# Patient Record
Sex: MALE | Race: WHITE | NOT HISPANIC OR LATINO | Employment: PART TIME | URBAN - METROPOLITAN AREA
[De-identification: names, ages, dates, MRNs, and addresses within clinical notes are randomized per-mention and may not be internally consistent; named-entity substitution may affect disease eponyms.]

---

## 2022-01-19 ENCOUNTER — TELEPHONE (OUTPATIENT)
Dept: HEMATOLOGY/ONCOLOGY | Facility: CLINIC | Age: 72
End: 2022-01-19
Payer: MEDICARE

## 2022-01-19 NOTE — TELEPHONE ENCOUNTER
----- Message from Elliot Hay MD sent at 1/18/2022  4:12 PM CST -----  Amber ulloa,   Pt is father of one our vascular surgeons (pt name; contact info below).  He has DVT x 2.  He travels between [place] and [place].   Can you reach out to schedule an appt with me during a time he will be in [place] in coming weeks?  Thank you   Dr. ZACH Helton    Male, 71 y.o., 1950  Phone:   [phone] (A)    ----- Message -----  From: Remington Helton MD  Sent: 1/14/2022   5:18 PM CST  To: MD Dr. Bharti Robertson,    Our parents are friends and from [place].  My dad has had his second LE DVT and is currently on Xarelto.  I was wondering if I could get him in to see you to determine if he needs long term anticoagulation, and if you could give me a call when you have a few minutes any day.  My cell is [phone].  I appreciate your help.    Nader

## 2022-01-19 NOTE — TELEPHONE ENCOUNTER
Called patient to discuss benign hematology consult with Dr. Hay in coming weeks. Patient states he will be in town on February 2, 8, and 25th. Discussed with Dr. Hay, ok to put in his benign hematology clinic on Feb 2 in the morning. Patient and I reviewed where to go for appt, verbalized understanding.     Will attempt to request records from patient's PCP who ordered the US that diagnosed both DVTs, patient states US was done at [facility] on [address].

## 2022-02-02 ENCOUNTER — OFFICE VISIT (OUTPATIENT)
Dept: HEMATOLOGY/ONCOLOGY | Facility: CLINIC | Age: 72
End: 2022-02-02
Payer: MEDICARE

## 2022-02-02 VITALS
OXYGEN SATURATION: 99 % | WEIGHT: 166.13 LBS | RESPIRATION RATE: 16 BRPM | DIASTOLIC BLOOD PRESSURE: 69 MMHG | SYSTOLIC BLOOD PRESSURE: 114 MMHG | BODY MASS INDEX: 23.26 KG/M2 | HEART RATE: 53 BPM | HEIGHT: 71 IN

## 2022-02-02 DIAGNOSIS — I82.4Y9 DEEP VEIN THROMBOSIS (DVT) OF PROXIMAL LOWER EXTREMITY, UNSPECIFIED CHRONICITY, UNSPECIFIED LATERALITY: Primary | ICD-10-CM

## 2022-02-02 PROCEDURE — 99999 PR PBB SHADOW E&M-EST. PATIENT-LVL III: ICD-10-PCS | Mod: PBBFAC,,, | Performed by: INTERNAL MEDICINE

## 2022-02-02 PROCEDURE — 99205 OFFICE O/P NEW HI 60 MIN: CPT | Mod: S$PBB,,, | Performed by: INTERNAL MEDICINE

## 2022-02-02 PROCEDURE — 99999 PR PBB SHADOW E&M-EST. PATIENT-LVL III: CPT | Mod: PBBFAC,,, | Performed by: INTERNAL MEDICINE

## 2022-02-02 PROCEDURE — 99213 OFFICE O/P EST LOW 20 MIN: CPT | Mod: PBBFAC | Performed by: INTERNAL MEDICINE

## 2022-02-02 PROCEDURE — 99205 PR OFFICE/OUTPT VISIT, NEW, LEVL V, 60-74 MIN: ICD-10-PCS | Mod: S$PBB,,, | Performed by: INTERNAL MEDICINE

## 2022-02-02 RX ORDER — RIVAROXABAN 20 MG/1
20 TABLET, FILM COATED ORAL DAILY
COMMUNITY
Start: 2021-12-26

## 2022-02-02 RX ORDER — SIMVASTATIN 40 MG/1
40 TABLET, FILM COATED ORAL NIGHTLY
COMMUNITY
Start: 2022-01-03

## 2022-02-02 NOTE — PROGRESS NOTES
dvt x 2  Right leg  -provoked  Left leg 2021 -? Provoked     Sister with plt problem   On xarelto since 2021      Sister with plt problem     Mom  suddenly - ?    No residual symptoms   No history of miscarriage    2 sons   1 daughter 2 kids       Hold xarelto  Panel on e week later in   Call merary  Phone call 10 days later

## 2022-02-02 NOTE — PROGRESS NOTES
"SECTION OF HEMATOLOGY AND BONE MARROW TRANSPLANT  New Patient Visit   2022  Referred by: Remington Helton MD  Referred for: recurrent DVT    CHIEF COMPLAINT: No chief complaint on file.      HISTORY OF PRESENT ILLNESS:   72 y.o.male; pmh of well controlled HL on statin;  Also history of DVT x 2.  With regard to VTE history; history of symptomatic RLE DVT in 2018 following mild trauma and long haul flight.   Completed 6 months of xarelto at that time with no bleeding issues.  2021 was noted to have new onset LLE edema so had repeat US showing LLE DVT.  In days leading up to 2nd  DVT had had multiple 2-4 hr car trips but no other provoking factors.  He has remained in xarelto since 2021 and other than mild bruising and mildly prolonged bleeding with shaving/abrasions has no significant bleeding.  Edema pain following both DVT's has completely resolved.  He states his mother  suddenly at young age with working diagnosis of MI but they are unsure of exact cause of death.  He states his sister has a "platelet problem" but unable to elucidate further.  No other first degree relatives with confirmed ATE/VTE/miscarriage.   He is non smoker. Active.  Non obese. No hormonal replacement therapy.  2 sons (in 30's and healthy), 1 daughter (34 with 2 children; both uneventful pregnancies)    PAST MEDICAL HISTORY:   History reviewed. No pertinent past medical history.    PAST SURGICAL HISTORY:   Past Surgical History:   Procedure Laterality Date    kidney stone removal         PAST SOCIAL HISTORY:   reports that he has never smoked. He has never used smokeless tobacco. He reports that he does not use drugs.    FAMILY HISTORY:  Family History   Problem Relation Age of Onset    Prostate cancer Father        CURRENT MEDICATIONS:   Current Outpatient Medications   Medication Sig    simvastatin (ZOCOR) 40 MG tablet Take 40 mg by mouth nightly.    XARELTO 20 mg Tab Take 20 mg by mouth once daily.     No " current facility-administered medications for this visit.     ALLERGIES:   Review of patient's allergies indicates:  No Known Allergies          REVIEW OF SYSTEMS:   Negative     PHYSICAL EXAM:   Vitals:    02/02/22 1017   BP: 114/69   Pulse: (!) 53   Resp: 16       General - well developed, well nourished, no apparent distress  Head & Face - no sinus tenderness  Eyes - normal conjunctivae and lids   ENT - normal external auditory canals and tympanic membranes bilaterally oropharynx clear,  Normal dentition and gums  Neck - normal thyroid  Chest and Lung - normal respiratory effort, clear to auscultation bilaterally   Cardiovascular - RRR with no MGR, normal S1 and S2; no pedal edema  Abdomen -  soft, nontender, no palpable hepatomegaly or splenomegaly  Lymph - no palpable lymphadenopathy  Extremities - unremarkable nails and digits  Heme - no bruising, petechiae, pallor  Skin - no rashes or lesions  Psych - appropriate mood and affect      ECOG Performance Status: (foot note - ECOG PS provided by Eastern Co[employer]cology [employer]) 1 - Symptomatic but completely ambulatory    Karnofsky Performance Score:  90%- Able to Carry on Normal Activity: Minor Symptoms of Disease  DATA:   See epic media for ultrasound reports from 2018 and 2021 DVT's    ASSESSMENT AND PLAN:   Encounter Diagnosis   Name Primary?    Deep vein thrombosis (DVT) of proximal lower extremity, unspecified chronicity, unspecified laterality Yes     -recurrent LE DVT x 2; given recurrent lifelong anticoagulation is warranted; recommend continue xarelto 20mg daily  -as would change mgmt (change to warfarin), will screen for  antiphospholipid antibodies  -given potential family history of VTE (sudden death in mother) and that results may affect his children, will send additional complete thrombophilia panel  -discussed needs to be off xarelto for 7 days prior to testing as can cause false positives in panel; thrombophilia panel labs scheduled for  2/16/22 at [place] lab  -should restart xarelto after labs drawn  -I will visit with pt virtually one week after labs to review results and any subsequent recommendations      Follow Up: as above  Chapin Hay MD  Hematology/Oncology/Bone Marrow Transplant

## 2022-02-07 ENCOUNTER — TELEPHONE (OUTPATIENT)
Dept: HEMATOLOGY/ONCOLOGY | Facility: CLINIC | Age: 72
End: 2022-02-07
Payer: MEDICARE

## 2022-02-08 DIAGNOSIS — I82.4Y9 DEEP VEIN THROMBOSIS (DVT) OF PROXIMAL LOWER EXTREMITY, UNSPECIFIED CHRONICITY, UNSPECIFIED LATERALITY: Primary | ICD-10-CM

## 2022-02-14 ENCOUNTER — TELEPHONE (OUTPATIENT)
Dept: HEMATOLOGY/ONCOLOGY | Facility: CLINIC | Age: 72
End: 2022-02-14
Payer: MEDICARE

## 2022-02-14 NOTE — TELEPHONE ENCOUNTER
----- Message from Ashley Etienne sent at 2/14/2022  9:04 AM CST -----  Regarding: reschedule appt  Contact: pt  Pt requesting to Reschedule  Virtual appt    Please call and advise    Phone [phone]

## 2022-02-16 ENCOUNTER — PATIENT MESSAGE (OUTPATIENT)
Dept: HEMATOLOGY/ONCOLOGY | Facility: CLINIC | Age: 72
End: 2022-02-16
Payer: MEDICARE

## 2022-02-16 LAB
ALBUMIN SERPL BCP-MCNC: 4 G/DL (ref 3.4–5)
ALBUMIN/GLOBULIN RATIO: 1.43 RATIO (ref 1.1–1.8)
ALP SERPL-CCNC: 44 U/L (ref 45–117)
ALT SERPL W P-5'-P-CCNC: 32 U/L (ref 12–78)
ANION GAP SERPL CALC-SCNC: 12 MMOL/L (ref 3–11)
APTT PPP: 21.4 SEC (ref 22.6–35.4)
AST SERPL-CCNC: 27 U/L (ref 15–37)
BASOPHILS NFR BLD: 1.1 % (ref 0–3)
BILIRUB SERPL-MCNC: 0.5 MG/DL (ref 0–1)
BUN SERPL-MCNC: 26 MG/DL (ref 7–18)
BUN/CREAT SERPL: 21.66 RATIO (ref 7–18)
CALCIUM SERPL-MCNC: 9.2 MG/DL (ref 8.8–10.5)
CHLORIDE SERPL-SCNC: 107 MMOL/L (ref 100–108)
CO2 SERPL-SCNC: 24 MMOL/L (ref 21–32)
CREAT SERPL-MCNC: 1.2 MG/DL (ref 0.7–1.3)
EOSINOPHIL NFR BLD: 3.8 % (ref 1–3)
ERYTHROCYTE [DISTWIDTH] IN BLOOD BY AUTOMATED COUNT: 13.2 % (ref 12.5–18)
GFR ESTIMATION: 60
GLOBULIN: 2.8 G/DL (ref 2.3–3.5)
GLUCOSE SERPL-MCNC: 91 MG/DL (ref 70–110)
HCT VFR BLD AUTO: 43.3 % (ref 42–52)
HGB BLD-MCNC: 13.9 G/DL (ref 14–18)
INR PPP: 0.9 INR (ref 0.9–1.1)
LYMPHOCYTES NFR BLD: 28.2 % (ref 25–40)
MCH RBC QN AUTO: 30.8 PG (ref 27–31.2)
MCHC RBC AUTO-ENTMCNC: 32.1 G/DL (ref 31.8–35.4)
MCV RBC AUTO: 95.8 FL (ref 80–97)
MONOCYTES NFR BLD: 10.1 % (ref 1–15)
NEUTROPHILS # BLD AUTO: 2.53 10*3/UL (ref 1.8–7.7)
NEUTROPHILS NFR BLD: 56.6 % (ref 37–80)
NUCLEATED RED BLOOD CELLS: 0 %
PLATELETS: 238 10*3/UL (ref 142–424)
POTASSIUM SERPL-SCNC: 4.6 MMOL/L (ref 3.6–5.2)
PROT SERPL-MCNC: 6.8 G/DL (ref 6.4–8.2)
PROTHROMBIN TIME: 10.6 SEC (ref 10.2–12.4)
RBC # BLD AUTO: 4.52 10*6/UL (ref 4.7–6.1)
SODIUM BLD-SCNC: 143 MMOL/L (ref 135–145)
WBC # BLD: 4.5 10*3/UL (ref 4.6–10.2)

## 2022-02-25 LAB
ANTITHROMBIN III ACTIVITY: 103 % (ref 76–128)
ANTITHROMBIN III ANTIGEN: 89 % (ref 82–136)
F5 GENE MUT ANL BLD/T: NORMAL
FACTOR V LEIDEN SPECIMEN SOURCE: NORMAL
PROTEIN C-FUNCTIONAL: 121 % (ref 83–168)
PROTEIN S-FUNCTIONAL: 107 % (ref 66–143)

## 2022-02-28 LAB
B2 GLYCOPROT1 IGA SER-ACNC: <10 SAU
B2 GLYCOPROT1 IGG SER-ACNC: <10 SGU
B2 GLYCOPROT1 IGM SER-ACNC: <10 SMU
CARDIOLIPIN IGA SER IA-ACNC: <10 APL
CARDIOLIPIN IGG SER IA-ACNC: <10 GPL
CARDIOLIPIN IGM SER IA-ACNC: <10 MPL
DILUTE RUSSELL VIPER VENOM TIME CORRECTED: ABNORMAL SEC (ref 33–44)
DRVVT CONFIRM: ABNORMAL RATIO
DRVVT SCREEN: 27 SEC (ref 33–44)
F2 C.20210G>A GENO BLD/T: NEGATIVE
F5 GENE MUT ANL BLD/T: NEGATIVE
FACTOR V LEIDEN SPECIMEN SOURCE: NORMAL
PROTHROMBIN GENE MUTATION SPECIMEN SOURCE: NORMAL

## 2022-03-14 ENCOUNTER — OFFICE VISIT (OUTPATIENT)
Dept: HEMATOLOGY/ONCOLOGY | Facility: CLINIC | Age: 72
End: 2022-03-14
Payer: MEDICARE

## 2022-03-14 DIAGNOSIS — I82.409 RECURRENT DEEP VEIN THROMBOSIS (DVT): Primary | ICD-10-CM

## 2022-03-14 DIAGNOSIS — Z79.01 CHRONIC ANTICOAGULATION: ICD-10-CM

## 2022-03-14 PROCEDURE — 99441 PR PHYSICIAN TELEPHONE EVALUATION 5-10 MIN: CPT | Mod: 95,,, | Performed by: INTERNAL MEDICINE

## 2022-03-14 PROCEDURE — 99441 PR PHYSICIAN TELEPHONE EVALUATION 5-10 MIN: ICD-10-PCS | Mod: 95,,, | Performed by: INTERNAL MEDICINE

## 2022-03-14 NOTE — PROGRESS NOTES
The patient location is: home  The chief complaint leading to consultation is: recurrent DVT    Visit type: audio only    Face to Face time with patient: 10  20  minutes of total time spent on the encounter, which includes face to face time and non-face to face time preparing to see the patient (eg, review of tests), Obtaining and/or reviewing separately obtained history, Documenting clinical information in the electronic or other health record, Independently interpreting results (not separately reported) and communicating results to the patient/family/caregiver, or Care coordination (not separately reported).         Each patient to whom he or she provides medical services by telemedicine is:  (1) informed of the relationship between the physician and patient and the respective role of any other health care provider with respect to management of the patient; and (2) notified that he or she may decline to receive medical services by telemedicine and may withdraw from such care at any time.    Notes:       SECTION OF HEMATOLOGY AND BONE MARROW TRANSPLANT  Return  Patient Visit   03/16/2022  Referred by: Remington Helton MD  Referred for: recurrent DVT    CHIEF COMPLAINT: No chief complaint on file.      HISTORY OF PRESENT ILLNESS:   Initial consult note- 2/2/22  72 y.o.male; pmh of well controlled HL on statin;  Also history of DVT x 2.  With regard to VTE history; history of symptomatic RLE DVT in 2018 following mild trauma and long haul flight.   Completed 6 months of xarelto at that time with no bleeding issues.  September 2021 was noted to have new onset LLE edema so had repeat US showing LLE DVT.  In days leading up to 2nd  DVT had had multiple 2-4 hr car trips but no other provoking factors.  He has remained in xarelto since sept 2021 and other than mild bruising and mildly prolonged bleeding with shaving/abrasions has no significant bleeding.  Edema pain following both DVT's has completely resolved.  He  "states his mother  suddenly at young age with working diagnosis of MI but they are unsure of exact cause of death.  He states his sister has a "platelet problem" but unable to elucidate further.  No other first degree relatives with confirmed ATE/VTE/miscarriage.   He is non smoker. Active.  Non obese. No hormonal replacement therapy.  2 sons (in 30's and healthy), 1 daughter (34 with 2 children; both uneventful pregnancies)    Interval visit- 3/14/22  He presents today to review thrombophilia panel completed at [facility] on 22. He appropriately held xarelto prior to testing.  Since last appt no bleeding, signs/symptoms for VTE.   ostensible superficial melanoma diagnosed since last appt and being seen by surgery at [facility] on 22.  PAST MEDICAL HISTORY:   History reviewed. No pertinent past medical history.    PAST SURGICAL HISTORY:   Past Surgical History:   Procedure Laterality Date    kidney stone removal         PAST SOCIAL HISTORY:   reports that he has never smoked. He has never used smokeless tobacco. He reports that he does not use drugs.    FAMILY HISTORY:  Family History   Problem Relation Age of Onset    Prostate cancer Father        CURRENT MEDICATIONS:   Current Outpatient Medications   Medication Sig    simvastatin (ZOCOR) 40 MG tablet Take 40 mg by mouth nightly.    XARELTO 20 mg Tab Take 20 mg by mouth once daily.     No current facility-administered medications for this visit.     ALLERGIES:   Review of patient's allergies indicates:  No Known Allergies          REVIEW OF SYSTEMS:   Negative     PHYSICAL EXAM:   physical exam deferred due to telemed        ECOG Performance Status: (foot note - ECOG PS provided by [employer] Co[employer]cology Group) 1 - Symptomatic but completely ambulatory    Karnofsky Performance Score:  90%- Able to Carry on Normal Activity: Minor Symptoms of Disease  DATA:   See epic media for ultrasound reports from 2018 and  DVT's  Full " thrombophilia panel from 2/16/22 and all results normal   ASSESSMENT AND PLAN:   Encounter Diagnoses   Name Primary?    Recurrent deep vein thrombosis (DVT) Yes    Chronic anticoagulation      -recurrent LE DVT x 2; given recurrent episodes, lifelong anticoagulation remains indicated ; recommend continue rivaroxaban 20mg daily  -to confirm no underlying APLS which may have necessitated transition to coumadin and to ensure no possible heritable thrombophilia panel for which children would need need screening, we obtained full thrombophilia panel  On 2/16/22 that was unremarkable  -reviewed results with pt on phone and recommendations to continue rivaroxaban indefinitely  -discussed modifiable VTE risk factors  -discussed bleeding and VTE symptoms for which to seek immediate medical attn  -he is aware to contact us prior to surgery or invasive dental procedures for perioperative anticoagulation management including if operation being done at [facility] for his recently diagnosed LE superficial melanoma (appt at [facility] 4/12/22)   -will need annual CMP to monitor renal and hepatic function while on rivaroxaban    Follow Up: cbc, cmp at [place] lab in 1 year  -virtual MD appt 1-2 days after labs     Chapin Hay MD  Hematology/Oncology/Bone Marrow Transplant

## 2022-03-29 ENCOUNTER — PATIENT MESSAGE (OUTPATIENT)
Dept: HEMATOLOGY/ONCOLOGY | Facility: CLINIC | Age: 72
End: 2022-03-29
Payer: MEDICARE

## 2022-07-28 ENCOUNTER — TELEPHONE (OUTPATIENT)
Dept: PODIATRY | Facility: CLINIC | Age: 72
End: 2022-07-28
Payer: MEDICARE

## 2022-07-29 ENCOUNTER — OFFICE VISIT (OUTPATIENT)
Dept: VASCULAR SURGERY | Facility: CLINIC | Age: 72
End: 2022-07-29
Payer: MEDICARE

## 2022-07-29 DIAGNOSIS — L97.319 VENOUS ULCER OF ANKLE, RIGHT: Primary | ICD-10-CM

## 2022-07-29 DIAGNOSIS — I89.0 LYMPHEDEMA OF BOTH LOWER EXTREMITIES: ICD-10-CM

## 2022-07-29 DIAGNOSIS — I82.5Y3 CHRONIC DEEP VEIN THROMBOSIS (DVT) OF PROXIMAL VEIN OF BOTH LOWER EXTREMITIES: ICD-10-CM

## 2022-07-29 DIAGNOSIS — I87.303 VENOUS HYPERTENSION OF BOTH LOWER EXTREMITIES: ICD-10-CM

## 2022-07-29 DIAGNOSIS — I83.013 VENOUS ULCER OF ANKLE, RIGHT: Primary | ICD-10-CM

## 2022-07-29 PROCEDURE — 99205 PR OFFICE/OUTPT VISIT, NEW, LEVL V, 60-74 MIN: ICD-10-PCS | Mod: 95,,, | Performed by: SURGERY

## 2022-07-29 PROCEDURE — 99205 OFFICE O/P NEW HI 60 MIN: CPT | Mod: 95,,, | Performed by: SURGERY

## 2022-07-29 NOTE — PROGRESS NOTES
Remington Helton MD, RPVI                                 [facility]                           [facility]                             07/29/2022    HPI:  Tristen Helton Jr. is a 72 y.o. male with There is no problem list on file for this patient.   being managed by PCP and specialists who is here today for evaluation of RLE edema; postop R ankle melanoma excision with local advancement flap complicated by incision dehiscence, doing local wound care at home by himself, intermittent compression with stocking; h/o BLE DVT, recent RLE venous US shows no DVT (report and imaging pending fax to our clinic).  Patient states location is RLE occurring for months.  Associated signs and symptoms include wound and edema.  Quality is heavy and severity is 8/10.  Symptoms began months ago.  Alleviating factors include elevation.  Worsening factors include dependency.  Patient has been wearing compression stockings for greater than 3 months.  +FH of venous disease.  Symptoms do limit patient's functional status and daily activities.  + DVT history.  no venous interventions.  + low sodium diet.  no excessive water intake.    Migraine with aura: no  PFO/ASD/right to left shunt: no  Pregnant: no  Breastfeeding: no    no MI  no Stroke  Tobacco use: no    No past medical history on file.  Past Surgical History:   Procedure Laterality Date    kidney stone removal       Family History   Problem Relation Age of Onset    Prostate cancer Father      Social History     Socioeconomic History    Marital status:    Tobacco Use    Smoking status: Never Smoker    Smokeless tobacco: Never Used   Substance and Sexual Activity    Drug use: Never       Current Outpatient Medications:     simvastatin (ZOCOR) 40 MG tablet, Take 40 mg by mouth nightly., Disp: , Rfl:     XARELTO 20 mg Tab, Take 20 mg by mouth once daily., Disp: , Rfl:     REVIEW OF SYSTEMS:  General: No fevers or chills; ENT: No sore  throat; Allergy and Immunology: no persistent infections; Hematological and Lymphatic: No history of bleeding or easy bruising; Endocrine: negative; Respiratory: no cough, shortness of breath, or wheezing; Cardiovascular: no chest pain or dyspnea on exertion; Gastrointestinal: no abdominal pain/back, change in bowel habits, or bloody stools; Genito-Urinary: no dysuria, trouble voiding, or hematuria; Musculoskeletal: edema and pain; Neurological: no TIA or stroke symptoms; Psychiatric: no nervousness, anxiety or depression.    PHYSICAL EXAM:                General appearance:  Alert, well-appearing, and in no distress.  Oriented to person, place, and time                    Neurological: Normal speech, no focal findings noted; CN II - XII grossly intact. RLE with sensation to light touch, LLE with sensation to light touch.            Musculoskeletal: Digits/nail without cyanosis/clubbing.  Strength 5/5 BLE.                    Neck: Supple, no significant adenopathy                  Chest:  No wheezes, symmetric air entry. No use of accessory muscles               Cardiac: Normal rate and regular rhythm            Abdomen: Soft, nontender, nondistended      Extremities:   2+ R DP pulse, 2+ L DP pulse      2+ RLE edema, 2+ LLE edema    Skin:  RLE + ulcer; LLE + ulcer      RLE no spider veins, LLE no spider veins      RLE no varicose veins, LLE no varicose veins    CEAP 6/6    VCSS 20    LAB RESULTS:  No results found for: CBC  Lab Results   Component Value Date    LABPROT 10.6 02/16/2022    INR 0.9 02/16/2022     Lab Results   Component Value Date     02/16/2022    K 4.6 02/16/2022     02/16/2022    CO2 24 02/16/2022    GLU 91 02/16/2022    BUN 26 (H) 02/16/2022    CREATININE 1.20 02/16/2022    CALCIUM 9.2 02/16/2022    ANIONGAP 12.0 (H) 02/16/2022     Lab Results   Component Value Date    WBC 4.5 (L) 02/16/2022    HGB 13.9 (L) 02/16/2022    HCT 43.3 02/16/2022    MCV 95.8 02/16/2022     .No results found  for: HGBA1C    IMAGING:  All pertinent imaging has been reviewed and interpreted independently.    OS Venous US 7/2022 LLE: No DVT    IMP/PLAN:  72 y.o. male with There is no problem list on file for this patient.   being managed by PCP and specialists who is here today for evaluation of RLE edema, lymphedema, and wound.    -Patient has secondary lymphedema and has tried and failed compression of 20-30 mm Hg, elevation and exercise for >1 month period however symptoms persist.  Basic pump trial performed and discontinued due to sensitivity and pain to static pressure.  Symptoms of swelling, pain and hyperplasia present.  A compression device is recommended to treat current symptoms and prevent disease progression. The patient has tried elevation, exercise and compression and symptoms remain.  The patient has marked hyperkeratosis with hyperplasia and hyperpigmentation.  - recommend lymphedema pumps  -Wound care  -Venous insuff US BLE  -recommend compression with Rx stockings, elevation, dietary changes associated with water and sodium intake discussed at length with patient  -Exercise     I spent 12 minutes evaluating this patient and greater than 50% of the time was spent counseling, coordinator care and discussing the plan of care.  All questions were answered and patient stated understanding with agreement with the above treatment plan.    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  There is also chronic appearing thrombus in the left peroneal vein.     Right veins:     Greater saphenous junction 6.6 mm.     Greater saphenous proximal thigh 5.3 mm.     Greater saphenous mid thigh 4.4 mm.     Greater saphenous distal thigh  4.0 mm.     Greater saphenous below knee 3.4 mm, reflux time 1572 milliseconds.     Greater saphenous mid calf 2.7 mm, reflux time 2422 milliseconds.     Lesser saphenous proximal 2 mm.     Lesser saphenous mid calf 2.5 mm, reflux time 654 milliseconds.     A SV proximal thigh 3.7 mm.     Right veins:     Greater saphenous vein junction 5.2 mm.     Greater saphenous proximal thigh 3.1 mm.     Greater saphenous mid thigh 4 mm.     Greater saphenous distal thigh 4.2 mm.     Greater saphenous below knee 3.7 mm.     Greater saphenous mid calf 3.9 mm.     Lesser saphenous proximal 2.0 mm.     Lesser saphenous mid calf 1.5 mm.     A SV proximal thigh 1.5 mm.     Impression:     Chronic appearing thrombus right mid and distal superficial femoral vein, right popliteal vein, and left peroneal.     Reflux right greater saphenous vein and right lesser saphenous vein.     Right and left common femoral vein reflux.    RECOMMENDATIONS:    -RIGHT GSV EVLT    Remington Helton MD RPVI  Vascular and Endovascular Surgery

## 2022-08-02 DIAGNOSIS — L97.319 VENOUS ULCER OF ANKLE, RIGHT: Primary | ICD-10-CM

## 2022-08-02 DIAGNOSIS — I83.013 VENOUS ULCER OF ANKLE, RIGHT: Primary | ICD-10-CM

## 2022-08-02 DIAGNOSIS — I82.5Y3 CHRONIC DEEP VEIN THROMBOSIS (DVT) OF PROXIMAL VEIN OF BOTH LOWER EXTREMITIES: ICD-10-CM

## 2022-08-02 DIAGNOSIS — I89.0 LYMPHEDEMA OF BOTH LOWER EXTREMITIES: ICD-10-CM

## 2022-08-02 DIAGNOSIS — I87.303 VENOUS HYPERTENSION OF BOTH LOWER EXTREMITIES: ICD-10-CM

## 2022-08-03 ENCOUNTER — TELEPHONE (OUTPATIENT)
Dept: ADMINISTRATIVE | Facility: OTHER | Age: 72
End: 2022-08-03
Payer: MEDICARE

## 2022-08-03 ENCOUNTER — TELEPHONE (OUTPATIENT)
Dept: WOUND CARE | Facility: HOSPITAL | Age: 72
End: 2022-08-03
Payer: MEDICARE

## 2022-08-03 NOTE — TELEPHONE ENCOUNTER
E: Wound Care Appointment  Received: Today  Deb Pickett RN sent to Suzie Marrufo MA  I spoke to the patient. He said he wanted to discuss a few things with his son and wife in regards to his medical care, and he will call me back later today or tomorrow. I provided him with my direct line.

## 2022-08-03 NOTE — TELEPHONE ENCOUNTER
Spoke to patient in regards to referral that was placed for wound care. Offered to assist patient with getting scheduled with a wound care clinic closer to home in [place]. Patient stated he would talk to his son when he sees them this weekend and get back to me about scheduling a wound care appointment. Gave patient my direct line.

## 2022-08-09 ENCOUNTER — HOSPITAL ENCOUNTER (OUTPATIENT)
Dept: RADIOLOGY | Facility: OTHER | Age: 72
Discharge: HOME | End: 2022-08-09
Attending: SURGERY
Payer: MEDICARE

## 2022-08-09 DIAGNOSIS — I82.5Y3 CHRONIC DEEP VEIN THROMBOSIS (DVT) OF PROXIMAL VEIN OF BOTH LOWER EXTREMITIES: ICD-10-CM

## 2022-08-09 DIAGNOSIS — I83.013 VENOUS ULCER OF ANKLE, RIGHT: ICD-10-CM

## 2022-08-09 DIAGNOSIS — L97.319 VENOUS ULCER OF ANKLE, RIGHT: ICD-10-CM

## 2022-08-09 DIAGNOSIS — I89.0 LYMPHEDEMA OF BOTH LOWER EXTREMITIES: ICD-10-CM

## 2022-08-09 DIAGNOSIS — I87.303 VENOUS HYPERTENSION OF BOTH LOWER EXTREMITIES: ICD-10-CM

## 2022-08-09 PROCEDURE — 93970 US LOWER EXTREMITY VEINS BILATERAL INSUFFICIENCY: ICD-10-PCS | Mod: 26,,, | Performed by: RADIOLOGY

## 2022-08-09 PROCEDURE — 93970 EXTREMITY STUDY: CPT | Mod: TC

## 2022-08-09 PROCEDURE — 93970 EXTREMITY STUDY: CPT | Mod: 26,,, | Performed by: RADIOLOGY

## 2022-08-11 DIAGNOSIS — I87.2 VENOUS INSUFFICIENCY: Primary | ICD-10-CM

## 2022-08-17 ENCOUNTER — TELEPHONE (OUTPATIENT)
Dept: VASCULAR SURGERY | Facility: CLINIC | Age: 72
End: 2022-08-17
Payer: MEDICARE

## 2022-08-18 DIAGNOSIS — R60.9 EDEMA, UNSPECIFIED: ICD-10-CM

## 2022-08-18 DIAGNOSIS — Z98.890 STATUS POST LASER ABLATION OF INCOMPETENT VEIN: Primary | ICD-10-CM

## 2022-09-06 ENCOUNTER — PATIENT MESSAGE (OUTPATIENT)
Dept: VASCULAR SURGERY | Facility: CLINIC | Age: 72
End: 2022-09-06
Payer: MEDICARE

## 2022-09-07 ENCOUNTER — OFFICE VISIT (OUTPATIENT)
Dept: VASCULAR SURGERY | Facility: CLINIC | Age: 72
End: 2022-09-07
Payer: MEDICARE

## 2022-09-07 DIAGNOSIS — L97.319 VENOUS ULCER OF ANKLE, RIGHT: ICD-10-CM

## 2022-09-07 DIAGNOSIS — I83.013 VENOUS ULCER OF ANKLE, RIGHT: ICD-10-CM

## 2022-09-07 DIAGNOSIS — I87.2 VENOUS INSUFFICIENCY: Primary | ICD-10-CM

## 2022-09-07 DIAGNOSIS — I89.0 LYMPHEDEMA OF BOTH LOWER EXTREMITIES: ICD-10-CM

## 2022-09-07 PROCEDURE — 99214 PR OFFICE/OUTPT VISIT, EST, LEVL IV, 30-39 MIN: ICD-10-PCS | Mod: 95,,, | Performed by: SURGERY

## 2022-09-07 PROCEDURE — 99214 OFFICE O/P EST MOD 30 MIN: CPT | Mod: 95,,, | Performed by: SURGERY

## 2022-09-07 NOTE — PROGRESS NOTES
Remington Helton MD, RPVI                                 [facility]                           [facility]                             09/07/2022    HPI:  Tristen Helton Jr. is a 72 y.o. male with There is no problem list on file for this patient.   being managed by PCP and specialists who is here today for evaluation of RLE edema; postop R ankle melanoma excision with local advancement flap complicated by incision dehiscence, doing local wound care at home by himself, intermittent compression with stocking; h/o BLE DVT, recent RLE venous US shows no DVT (report and imaging pending fax to our clinic).  Patient states location is RLE occurring for months.  Associated signs and symptoms include wound and edema.  Quality is heavy and severity is 8/10.  Symptoms began months ago.  Alleviating factors include elevation.  Worsening factors include dependency.  Patient has been wearing compression stockings for greater than 3 months.  +FH of venous disease.  Symptoms do limit patient's functional status and daily activities.  + DVT history.  no venous interventions.  + low sodium diet.  no excessive water intake.    Migraine with aura: no  PFO/ASD/right to left shunt: no  Pregnant: no  Breastfeeding: no    no MI  no Stroke  Tobacco use: no    9/2022:  States edema improved although remains significant.  +compression.      No past medical history on file.  Past Surgical History:   Procedure Laterality Date    kidney stone removal       Family History   Problem Relation Age of Onset    Prostate cancer Father      Social History     Socioeconomic History    Marital status:    Tobacco Use    Smoking status: Never    Smokeless tobacco: Never   Substance and Sexual Activity    Drug use: Never       Current Outpatient Medications:     simvastatin (ZOCOR) 40 MG tablet, Take 40 mg by mouth nightly., Disp: , Rfl:     XARELTO 20 mg Tab, Take 20 mg by mouth once daily., Disp: , Rfl:     REVIEW  OF SYSTEMS:  General: No fevers or chills; ENT: No sore throat; Allergy and Immunology: no persistent infections; Hematological and Lymphatic: No history of bleeding or easy bruising; Endocrine: negative; Respiratory: no cough, shortness of breath, or wheezing; Cardiovascular: no chest pain or dyspnea on exertion; Gastrointestinal: no abdominal pain/back, change in bowel habits, or bloody stools; Genito-Urinary: no dysuria, trouble voiding, or hematuria; Musculoskeletal: edema and pain; Neurological: no TIA or stroke symptoms; Psychiatric: no nervousness, anxiety or depression.    PHYSICAL EXAM:                General appearance:  Alert, well-appearing, and in no distress.  Oriented to person, place, and time                    Neurological: Normal speech, no focal findings noted; CN II - XII grossly intact. RLE with sensation to light touch, LLE with sensation to light touch.            Musculoskeletal: Digits/nail without cyanosis/clubbing.  Strength 5/5 BLE.                    Neck: Supple, no significant adenopathy                  Chest:  No wheezes, symmetric air entry. No use of accessory muscles               Cardiac: Normal rate and regular rhythm            Abdomen: Soft, nontender, nondistended      Extremities:   2+ R DP pulse, 2+ L DP pulse      2+ RLE edema, 2+ LLE edema    Skin:  RLE + ulcer; LLE + ulcer      RLE no spider veins, LLE no spider veins      RLE no varicose veins, LLE no varicose veins    CEAP 6/6    VCSS 20    LAB RESULTS:  No results found for: CBC  Lab Results   Component Value Date    LABPROT 10.6 02/16/2022    INR 0.9 02/16/2022     Lab Results   Component Value Date     02/16/2022    K 4.6 02/16/2022     02/16/2022    CO2 24 02/16/2022    GLU 91 02/16/2022    BUN 26 (H) 02/16/2022    CREATININE 1.20 02/16/2022    CALCIUM 9.2 02/16/2022    ANIONGAP 12.0 (H) 02/16/2022     Lab Results   Component Value Date    WBC 4.5 (L) 02/16/2022    HGB 13.9 (L) 02/16/2022    HCT 43.3  02/16/2022    MCV 95.8 02/16/2022     .No results found for: HGBA1C    IMAGING:  All pertinent imaging has been reviewed and interpreted independently.    [facility] Venous US 7/2022 LLE: No DVT    IMP/PLAN:  72 y.o. male with There is no problem list on file for this patient.   being managed by PCP and specialists who is here today for evaluation of RLE edema, lymphedema, and wound.    -Patient has secondary lymphedema and has tried and failed compression of 20-30 mm Hg, elevation and exercise for >1 month period however symptoms persist.  Basic pump trial performed and discontinued due to sensitivity and pain to static pressure.  Symptoms of swelling, pain and hyperplasia present.  A compression device is recommended to treat current symptoms and prevent disease progression. The patient has tried elevation, exercise and compression and symptoms remain.  The patient has marked hyperkeratosis with hyperplasia and hyperpigmentation.  - recommend lymphedema pumps  -Wound care  -Venous reflux R GSV knee/calf and R SSV calf - rec R GSV EVLT  -recommend compression with Rx stockings, elevation, dietary changes associated with water and sodium intake discussed at length with patient  -Exercise     I spent 12 minutes evaluating this patient and greater than 50% of the time was spent counseling, coordinator care and discussing the plan of care.  All questions were answered and patient stated understanding with agreement with the above treatment plan.    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  There is also chronic appearing thrombus in the left peroneal vein.     Right veins:     Greater saphenous junction 6.6 mm.     Greater saphenous  proximal thigh 5.3 mm.     Greater saphenous mid thigh 4.4 mm.     Greater saphenous distal thigh 4.0 mm.     Greater saphenous below knee 3.4 mm, reflux time 1572 milliseconds.     Greater saphenous mid calf 2.7 mm, reflux time 2422 milliseconds.     Lesser saphenous proximal 2 mm.     Lesser saphenous mid calf 2.5 mm, reflux time 654 milliseconds.     A SV proximal thigh 3.7 mm.     Right veins:     Greater saphenous vein junction 5.2 mm.     Greater saphenous proximal thigh 3.1 mm.     Greater saphenous mid thigh 4 mm.     Greater saphenous distal thigh 4.2 mm.     Greater saphenous below knee 3.7 mm.     Greater saphenous mid calf 3.9 mm.     Lesser saphenous proximal 2.0 mm.     Lesser saphenous mid calf 1.5 mm.     A SV proximal thigh 1.5 mm.     Impression:     Chronic appearing thrombus right mid and distal superficial femoral vein, right popliteal vein, and left peroneal.     Reflux right greater saphenous vein and right lesser saphenous vein.     Right and left common femoral vein reflux.    RECOMMENDATIONS:    -RIGHT GSV EVLT    Remington Helton MD VI  Vascular and Endovascular Surgery

## 2022-09-09 DIAGNOSIS — Z91.89 AT HIGH RISK FOR PAIN FROM PROCEDURE: Primary | ICD-10-CM

## 2022-09-12 ENCOUNTER — PROCEDURE VISIT (OUTPATIENT)
Dept: VASCULAR SURGERY | Facility: CLINIC | Age: 72
End: 2022-09-12
Payer: MEDICARE

## 2022-09-12 DIAGNOSIS — Z91.89 AT HIGH RISK FOR PAIN FROM PROCEDURE: Primary | ICD-10-CM

## 2022-09-12 DIAGNOSIS — I87.2 VENOUS INSUFFICIENCY: ICD-10-CM

## 2022-09-12 PROCEDURE — 36478 PR ENDOVENOUS LASER, 1ST VEIN: ICD-10-PCS | Mod: RT,S$GLB,, | Performed by: SURGERY

## 2022-09-12 PROCEDURE — 36478 ENDOVENOUS LASER 1ST VEIN: CPT | Mod: RT,S$GLB,, | Performed by: SURGERY

## 2022-09-12 RX ORDER — ALPRAZOLAM 0.5 MG/1
0.5 TABLET ORAL ONCE AS NEEDED
COMMUNITY
Start: 2022-09-12 | End: 2022-09-12

## 2022-09-12 RX ORDER — MELOXICAM 7.5 MG/1
7.5 TABLET ORAL 2 TIMES DAILY
Qty: 14 TABLET | Refills: 0 | OUTPATIENT
Start: 2022-09-12 | End: 2022-09-19

## 2022-09-12 RX ORDER — ALPRAZOLAM 0.5 MG/1
TABLET ORAL
Qty: 2 TABLET | Refills: 0 | OUTPATIENT
Start: 2022-09-12

## 2022-09-12 RX ORDER — MELOXICAM 7.5 MG/1
7.5 TABLET ORAL EVERY 12 HOURS
COMMUNITY
Start: 2022-09-12 | End: 2022-09-15

## 2022-09-12 RX ORDER — LIDOCAINE HYDROCHLORIDE 10 MG/ML
1 INJECTION INFILTRATION; PERINEURAL
Status: SHIPPED | OUTPATIENT
Start: 2022-09-12

## 2022-09-12 NOTE — PROCEDURES
Tristen Helton Jr.  09/12/2022     Pre-Procedure Diagnosis:   Right greater saphenous vein reflux; significant superficial venous insufficiency  Right lower extremity ulceration  Lymphedema bilateral lower extremity    Post-Procedure Diagnsosis: Same    Procedure: Laser endovenous ablation of the right greater saphenous vein    Surgeon: Remington Helton MD    Assistant: Joe Larkin MS3    Anesthesia: Local    The skin of the leg was prepped and draped in sterile fashion.  Ultrasound-guidance was used throughout the procedure with a portable duplex ultrasound machine.  The right GSV was cannulated using a micro-puncture system.  An 0.035 wire J-tip followed by a 4-Fr Angiodynamics sheath was placed throughout the right GSV to the level just above the knee.   Using tumescent anesthesia, the entire sheathed portion of the GSV was anesthesized keeping the vein at least one cm from the skin; Klein pump was used.  Position of the tip of the laser was then reconfirmed to be approximately 2 cm from the knee.  The 1470 nm laser was then activated and the vein was treated at ~ 49 J/cm.  The fiber and sheath were then removed intact.  Duplex confirmed occlusion of the GSV with continued patency of the common femoral vein.   The incision was closed with Steri-strips.   Sterile compression dressings and a compression stocking were applied and the patient was discharged to home in a satisfactory condition.    Cannulation site: lower calf    Sheath length: 22 cm    Gil of power: 7 W    Laser time: 178 sec    Joules: 1248.1 J             Remington Helton MD   Vascular & Endovascular Surgery

## 2022-09-14 ENCOUNTER — HOSPITAL ENCOUNTER (OUTPATIENT)
Dept: RADIOLOGY | Facility: OTHER | Age: 72
Discharge: HOME | End: 2022-09-14
Attending: SURGERY
Payer: MEDICARE

## 2022-09-14 DIAGNOSIS — Z98.890 STATUS POST LASER ABLATION OF INCOMPETENT VEIN: ICD-10-CM

## 2022-09-14 DIAGNOSIS — R60.9 EDEMA, UNSPECIFIED: ICD-10-CM

## 2022-09-14 PROCEDURE — 93971 EXTREMITY STUDY: CPT | Mod: TC,RT

## 2022-09-14 PROCEDURE — 93971 US LOWER EXTREMITY VEINS RIGHT: ICD-10-PCS | Mod: 26,RT,, | Performed by: RADIOLOGY

## 2022-09-14 PROCEDURE — 93971 EXTREMITY STUDY: CPT | Mod: 26,RT,, | Performed by: RADIOLOGY

## 2022-11-04 ENCOUNTER — HOSPITAL ENCOUNTER (OUTPATIENT)
Dept: RADIOLOGY | Facility: OTHER | Age: 72
Discharge: HOME | End: 2022-11-04
Attending: SURGERY
Payer: MEDICARE

## 2022-11-04 ENCOUNTER — OFFICE VISIT (OUTPATIENT)
Dept: VASCULAR SURGERY | Facility: CLINIC | Age: 72
End: 2022-11-04
Payer: MEDICARE

## 2022-11-04 VITALS
DIASTOLIC BLOOD PRESSURE: 84 MMHG | WEIGHT: 166 LBS | HEIGHT: 71 IN | BODY MASS INDEX: 23.24 KG/M2 | HEART RATE: 45 BPM | SYSTOLIC BLOOD PRESSURE: 147 MMHG

## 2022-11-04 DIAGNOSIS — R60.9 EDEMA, UNSPECIFIED: ICD-10-CM

## 2022-11-04 DIAGNOSIS — I89.0 LYMPHEDEMA OF BOTH LOWER EXTREMITIES: ICD-10-CM

## 2022-11-04 DIAGNOSIS — Z98.890 STATUS POST LASER ABLATION OF INCOMPETENT VEIN: ICD-10-CM

## 2022-11-04 DIAGNOSIS — I87.2 VENOUS INSUFFICIENCY: Primary | ICD-10-CM

## 2022-11-04 PROCEDURE — 93970 EXTREMITY STUDY: CPT | Mod: TC,RT

## 2022-11-04 PROCEDURE — 99214 OFFICE O/P EST MOD 30 MIN: CPT | Mod: S$GLB,,, | Performed by: SURGERY

## 2022-11-04 PROCEDURE — 93971 PR US DUPLEX, UPPER OR LOWER EXT VENOUS,UNILAT OR LTD: ICD-10-PCS | Mod: 26,RT,, | Performed by: RADIOLOGY

## 2022-11-04 PROCEDURE — 93971 EXTREMITY STUDY: CPT | Mod: 26,RT,, | Performed by: RADIOLOGY

## 2022-11-04 PROCEDURE — 99214 PR OFFICE/OUTPT VISIT, EST, LEVL IV, 30-39 MIN: ICD-10-PCS | Mod: S$GLB,,, | Performed by: SURGERY

## 2022-11-04 RX ORDER — SULFAMETHOXAZOLE AND TRIMETHOPRIM 800; 160 MG/1; MG/1
1 TABLET ORAL 2 TIMES DAILY
COMMUNITY
Start: 2022-06-07

## 2022-11-04 RX ORDER — TRAMADOL HYDROCHLORIDE 50 MG/1
50 TABLET ORAL EVERY 8 HOURS PRN
COMMUNITY
Start: 2022-05-16

## 2022-11-04 RX ORDER — POLYETHYLENE GLYCOL-3350 AND ELECTROLYTES 236; 6.74; 5.86; 2.97; 22.74 G/274.31G; G/274.31G; G/274.31G; G/274.31G; G/274.31G
POWDER, FOR SOLUTION ORAL
COMMUNITY
Start: 2022-09-06

## 2022-11-04 NOTE — PROGRESS NOTES
Remington Helton MD, RPVI                                 [facility]                           [facility]                             11/04/2022    HPI:  Tristen Bowerfrancie  is a 72 y.o. male with There is no problem list on file for this patient.   being managed by PCP and specialists who is here today for evaluation of RLE edema; postop R ankle melanoma excision with local advancement flap complicated by incision dehiscence, doing local wound care at home by himself, intermittent compression with stocking; h/o BLE DVT, recent RLE venous US shows no DVT (report and imaging pending fax to our clinic).  Patient states location is RLE occurring for months.  Associated signs and symptoms include wound and edema.  Quality is heavy and severity is 8/10.  Symptoms began months ago.  Alleviating factors include elevation.  Worsening factors include dependency.  Patient has been wearing compression stockings for greater than 3 months.  +FH of venous disease.  Symptoms do limit patient's functional status and daily activities.  + DVT history.  no venous interventions.  + low sodium diet.  no excessive water intake.    Migraine with aura: no  PFO/ASD/right to left shunt: no  Pregnant: no  Breastfeeding: no    no MI  no Stroke  Tobacco use: no    9/2022:  States edema improved although remains significant.  +compression.      11/2022:  s/p Laser endovenous ablation of the right greater saphenous vein 9/2022.  Doing well, RLE wound has healed.    No past medical history on file.  Past Surgical History:   Procedure Laterality Date    kidney stone removal       Family History   Problem Relation Age of Onset    Prostate cancer Father      Social History     Socioeconomic History    Marital status:    Tobacco Use    Smoking status: Never    Smokeless tobacco: Never   Substance and Sexual Activity    Drug use: Never       Current Outpatient Medications:     GAVILYTE-G 236-22.74-6.74 -5.86  gram suspension, SMARTSIG:Milliliter(s) By Mouth, Disp: , Rfl:     simvastatin (ZOCOR) 40 MG tablet, Take 40 mg by mouth nightly., Disp: , Rfl:     sulfamethoxazole-trimethoprim 800-160mg (BACTRIM DS) 800-160 mg Tab, Take 1 tablet by mouth 2 (two) times daily., Disp: , Rfl:     traMADoL (ULTRAM) 50 mg tablet, Take 50 mg by mouth every 8 (eight) hours as needed., Disp: , Rfl:     XARELTO 20 mg Tab, Take 20 mg by mouth once daily., Disp: , Rfl:     ALPRAZolam (XANAX) 0.5 MG tablet, Take 0.5 mg by mouth once as needed for Anxiety. On tab one hour prior to procedure, one tab at time of procedure, Disp: , Rfl:     meloxicam (MOBIC) 7.5 MG tablet, TAKE ONE TABLET BY MOUTH 1 HOUR BEFORE PROCEDURE, THEN ONE TABLET BY MOUTH EVERY 12 HOURS UNTIL ALL TAKEN, Disp: 14 tablet, Rfl: 0    Current Facility-Administered Medications:     LIDOcaine HCL 10 mg/ml (1%) injection 1 mL, 1 mL, Other, 1 time in Clinic/HOD, Remington Helton MD    LIDOcaine-EPINEPHrine 1%-1:100,000 30 mL, LIDOcaine HCL 10 mg/ml (1%) 20 mL, sodium bicarbonate 10 mL in sodium chloride 0.9% 500 mL solution, , MISCELLANEOUS, 1 time in Clinic/HOD, Remington Helton MD    REVIEW OF SYSTEMS:  General: No fevers or chills; ENT: No sore throat; Allergy and Immunology: no persistent infections; Hematological and Lymphatic: No history of bleeding or easy bruising; Endocrine: negative; Respiratory: no cough, shortness of breath, or wheezing; Cardiovascular: no chest pain or dyspnea on exertion; Gastrointestinal: no abdominal pain/back, change in bowel habits, or bloody stools; Genito-Urinary: no dysuria, trouble voiding, or hematuria; Musculoskeletal: edema and pain; Neurological: no TIA or stroke symptoms; Psychiatric: no nervousness, anxiety or depression.    PHYSICAL EXAM:      Pulse: (!) 45         General appearance:  Alert, well-appearing, and in no distress.  Oriented to person, place, and time                    Neurological: Normal speech, no focal findings  noted; CN II - XII grossly intact. RLE with sensation to light touch, LLE with sensation to light touch.            Musculoskeletal: Digits/nail without cyanosis/clubbing.  Strength 5/5 BLE.                    Neck: Supple, no significant adenopathy                  Chest:  No wheezes, symmetric air entry. No use of accessory muscles               Cardiac: Normal rate and regular rhythm            Abdomen: Soft, nontender, nondistended      Extremities:   2+ R DP pulse, 2+ L DP pulse      2+ RLE edema, 2+ LLE edema    Skin:  RLE healed ulcer; LLE + ulcer      RLE no spider veins, LLE no spider veins      RLE no varicose veins, LLE no varicose veins    CEAP 5/6    VCSS 20    LAB RESULTS:  No results found for: CBC  Lab Results   Component Value Date    LABPROT 10.6 02/16/2022    INR 0.9 02/16/2022     Lab Results   Component Value Date     02/16/2022    K 4.6 02/16/2022     02/16/2022    CO2 24 02/16/2022    GLU 91 02/16/2022    BUN 26 (H) 02/16/2022    CREATININE 1.20 02/16/2022    CALCIUM 9.2 02/16/2022    ANIONGAP 12.0 (H) 02/16/2022     Lab Results   Component Value Date    WBC 4.5 (L) 02/16/2022    HGB 13.9 (L) 02/16/2022    HCT 43.3 02/16/2022    MCV 95.8 02/16/2022     .No results found for: HGBA1C    IMAGING:  All pertinent imaging has been reviewed and interpreted independently.    [facility] Venous US 7/2022 LLE: No DVT    11/2022  EXAMINATION:  US LOWER EXTREMITY VENOUS INSUFFICIENCY RIGHT     CLINICAL HISTORY:  Other specified postprocedural states     TECHNIQUE:  Right lower extremity venous Doppler exam including Valsalva or standing maneuvers for evaluation of venous insufficiency of the superficial system.  Reflux times greater than 500 ms were considered indicative of superficial system reflux.     COMPARISON:  None     FINDINGS:  Superficial venous system:     Right leg:     There is no evidence of reflux lasting longer than 500 ms in either the right greater saphenous or  lesser saphenous  veins.  Right greater saphenous vein is thrombosed from the mid thigh 2 below-the-knee.    IMP/PLAN:  72 y.o. male with There is no problem list on file for this patient.   being managed by PCP and specialists who is here today for evaluation of RLE edema, lymphedema, and wound.    -Patient has secondary lymphedema and has tried and failed compression of 20-30 mm Hg, elevation and exercise for >1 month period however symptoms persist.  Basic pump trial performed and discontinued due to sensitivity and pain to static pressure.  Symptoms of swelling, pain and hyperplasia present.  A compression device is recommended to treat current symptoms and prevent disease progression. The patient has tried elevation, exercise and compression and symptoms remain.  The patient has marked hyperkeratosis with hyperplasia and hyperpigmentation.  - recommend lymphedema clinic and lymphedema pumps  -Wound care  -Venous reflux R GSV knee/calf and R SSV calf s/p R GSV EVLT with improved edema and healed wound  -recommend compression with Rx stockings, elevation, dietary changes associated with water and sodium intake discussed at length with patient.  -Exercise     I spent 12 minutes evaluating this patient and greater than 50% of the time was spent counseling, coordinator care and discussing the plan of care.  All questions were answered and patient stated understanding with agreement with the above treatment plan.

## 2023-03-10 DIAGNOSIS — I82.409 RECURRENT DEEP VEIN THROMBOSIS (DVT): Primary | ICD-10-CM

## 2024-01-17 RX ORDER — LIDOCAINE AND PRILOCAINE 25; 25 MG/G; MG/G
CREAM TOPICAL
Qty: 30 G | Refills: 11 | Status: SHIPPED | OUTPATIENT
Start: 2024-01-17

## 2024-02-06 ENCOUNTER — PATIENT MESSAGE (OUTPATIENT)
Dept: HEMATOLOGY/ONCOLOGY | Facility: CLINIC | Age: 74
End: 2024-02-06
Payer: MEDICARE

## 2024-02-06 ENCOUNTER — TELEPHONE (OUTPATIENT)
Dept: HEMATOLOGY/ONCOLOGY | Facility: CLINIC | Age: 74
End: 2024-02-06
Payer: MEDICARE

## 2024-02-06 NOTE — TELEPHONE ENCOUNTER
----- Message from Meghan Quinones sent at 2/6/2024  1:37 PM CST -----  Regarding: [facility]  Name of Who is Calling:    Pt daughter       What is the request in detail:  The pt has moved his treatment to [facility] and would leik to be moved off the drs pt list . Please advise        Can the clinic reply by [facility]:    no      What Number to Call Back if not in [employer]: Telephone Information:  Mobile          [phone]

## 2024-03-12 ENCOUNTER — TELEPHONE (OUTPATIENT)
Dept: DERMATOLOGY | Facility: CLINIC | Age: 74
End: 2024-03-12
Payer: MEDICARE

## 2024-03-12 ENCOUNTER — PATIENT MESSAGE (OUTPATIENT)
Dept: DERMATOLOGY | Facility: CLINIC | Age: 74
End: 2024-03-12
Payer: MEDICARE

## 2024-03-12 NOTE — TELEPHONE ENCOUNTER
Msgd pt via the portal.    ----- Message from Simona Harris MD sent at 3/11/2024  4:40 PM CDT -----  Ok thanks, can he tell you where his mm was biopsied and send path to us. Looks like not [facility].   ----- Message -----  From: Mehreen Grey LPN  Sent: 3/8/2024  10:54 AM CDT  To: Simona Harris MD    Spoke with pt. Attempted to get him sched for March MM clinic, pt states he will be out of town. Offered to pt sched for following month's MM clinic, pt stated he would be out of town. Pt stated he is free for June's MM Clinic. Was able to get pt sched in June's clinic.   ----- Message -----  From: Simona Harris MD  Sent: 3/4/2024   4:57 PM CST  To: Kimberly London    March MM clinic at 7:45 am   ----- Message -----  From: Remington Helton MD  Sent: 3/4/2024   4:33 PM CST  To: Simona Harris MD    Thank you very much!  He started his initial treatments and surgery at [facility] 2 years ago.  I appreciate your help!      Nader  ----- Message -----  From: Simona Harris MD  Sent: 3/4/2024  10:34 AM CST  To: Remington Helton MD    Hi   I am happy to see him. I am sorry he is going through this.  I have a specific melanoma clinic I can get him in.  Where is he getting his treatment? I dont see much in [facility]. Thanks, Simona  ----- Message -----  From: Remington Helton MD  Sent: 3/1/2024   3:35 PM CST  To: Simona Harris MD    Hey!    My dad developed swelling under his eyes near his cheek bones after starting Immunotherapy for melanoma.  He is wanting to see a Dermatologist to see if there is anything that can be done to assist.  I told him I knew a great one!!  I am not certain if there is anything specific you can do, but would you mind seeing him in your clinic?    Thank you.    Nader Helton

## 2024-03-14 ENCOUNTER — PATIENT MESSAGE (OUTPATIENT)
Dept: DERMATOLOGY | Facility: CLINIC | Age: 74
End: 2024-03-14
Payer: MEDICARE

## 2025-05-05 RX ORDER — ATORVASTATIN CALCIUM 40 MG/1
20 TABLET, FILM COATED ORAL DAILY
Qty: 45 TABLET | Refills: 3 | Status: SHIPPED | OUTPATIENT
Start: 2025-05-05 | End: 2026-05-05